# Patient Record
Sex: MALE | Race: WHITE | NOT HISPANIC OR LATINO | URBAN - METROPOLITAN AREA
[De-identification: names, ages, dates, MRNs, and addresses within clinical notes are randomized per-mention and may not be internally consistent; named-entity substitution may affect disease eponyms.]

---

## 2024-09-12 ENCOUNTER — APPOINTMENT (RX ONLY)
Dept: URBAN - METROPOLITAN AREA CLINIC 61 | Facility: CLINIC | Age: 69
Setting detail: DERMATOLOGY
End: 2024-09-12

## 2024-09-12 DIAGNOSIS — L82.1 OTHER SEBORRHEIC KERATOSIS: ICD-10-CM

## 2024-09-12 DIAGNOSIS — L72.8 OTHER FOLLICULAR CYSTS OF THE SKIN AND SUBCUTANEOUS TISSUE: ICD-10-CM

## 2024-09-12 DIAGNOSIS — L91.8 OTHER HYPERTROPHIC DISORDERS OF THE SKIN: ICD-10-CM

## 2024-09-12 DIAGNOSIS — D17 BENIGN LIPOMATOUS NEOPLASM: ICD-10-CM

## 2024-09-12 DIAGNOSIS — D22 MELANOCYTIC NEVI: ICD-10-CM

## 2024-09-12 DIAGNOSIS — L81.4 OTHER MELANIN HYPERPIGMENTATION: ICD-10-CM

## 2024-09-12 DIAGNOSIS — D18.0 HEMANGIOMA: ICD-10-CM

## 2024-09-12 PROBLEM — D17.21 BENIGN LIPOMATOUS NEOPLASM OF SKIN AND SUBCUTANEOUS TISSUE OF RIGHT ARM: Status: ACTIVE | Noted: 2024-09-12

## 2024-09-12 PROBLEM — D22.5 MELANOCYTIC NEVI OF TRUNK: Status: ACTIVE | Noted: 2024-09-12

## 2024-09-12 PROBLEM — D18.01 HEMANGIOMA OF SKIN AND SUBCUTANEOUS TISSUE: Status: ACTIVE | Noted: 2024-09-12

## 2024-09-12 PROBLEM — D22.4 MELANOCYTIC NEVI OF SCALP AND NECK: Status: ACTIVE | Noted: 2024-09-12

## 2024-09-12 PROCEDURE — ? COUNSELING

## 2024-09-12 PROCEDURE — 99203 OFFICE O/P NEW LOW 30 MIN: CPT

## 2024-09-12 ASSESSMENT — LOCATION DETAILED DESCRIPTION DERM
LOCATION DETAILED: RIGHT BUTTOCK
LOCATION DETAILED: RIGHT VENTRAL DISTAL FOREARM
LOCATION DETAILED: RIGHT SUPERIOR CENTRAL MALAR CHEEK
LOCATION DETAILED: LEFT CLAVICULAR NECK
LOCATION DETAILED: RIGHT ANTERIOR MEDIAL PROXIMAL UPPER ARM
LOCATION DETAILED: LEFT AXILLARY VAULT
LOCATION DETAILED: MID POSTERIOR NECK
LOCATION DETAILED: LEFT SUPERIOR CENTRAL MALAR CHEEK
LOCATION DETAILED: LEFT DISTAL DORSAL FOREARM
LOCATION DETAILED: LEFT SUPERIOR MEDIAL MALAR CHEEK
LOCATION DETAILED: LEFT LATERAL TRAPEZIAL NECK
LOCATION DETAILED: RIGHT FOREHEAD
LOCATION DETAILED: RIGHT AXILLARY VAULT
LOCATION DETAILED: LEFT CENTRAL ZYGOMA
LOCATION DETAILED: RIGHT MEDIAL ZYGOMA

## 2024-09-12 ASSESSMENT — LOCATION ZONE DERM
LOCATION ZONE: ARM
LOCATION ZONE: FACE
LOCATION ZONE: AXILLAE
LOCATION ZONE: NECK
LOCATION ZONE: TRUNK

## 2024-09-12 ASSESSMENT — LOCATION SIMPLE DESCRIPTION DERM
LOCATION SIMPLE: RIGHT ZYGOMA
LOCATION SIMPLE: LEFT ZYGOMA
LOCATION SIMPLE: RIGHT FOREARM
LOCATION SIMPLE: LEFT ANTERIOR NECK
LOCATION SIMPLE: POSTERIOR NECK
LOCATION SIMPLE: RIGHT UPPER ARM
LOCATION SIMPLE: RIGHT BUTTOCK
LOCATION SIMPLE: RIGHT FOREHEAD
LOCATION SIMPLE: RIGHT AXILLARY VAULT
LOCATION SIMPLE: LEFT AXILLARY VAULT
LOCATION SIMPLE: LEFT CHEEK
LOCATION SIMPLE: LEFT FOREARM
LOCATION SIMPLE: RIGHT CHEEK

## 2024-09-12 NOTE — PROCEDURE: COUNSELING
Detail Level: Detailed
Patient Specific Counseling (Will Not Stick From Patient To Patient): Patient reports that it intermittently drains.  Discussed options for removal, but patient reports it is not bothersome enough for excision.